# Patient Record
Sex: FEMALE
[De-identification: names, ages, dates, MRNs, and addresses within clinical notes are randomized per-mention and may not be internally consistent; named-entity substitution may affect disease eponyms.]

---

## 2022-08-11 PROBLEM — Z00.00 ENCOUNTER FOR PREVENTIVE HEALTH EXAMINATION: Status: ACTIVE | Noted: 2022-08-11

## 2022-08-17 ENCOUNTER — APPOINTMENT (OUTPATIENT)
Dept: PULMONOLOGY | Facility: CLINIC | Age: 77
End: 2022-08-17

## 2023-03-07 ENCOUNTER — APPOINTMENT (OUTPATIENT)
Dept: PULMONOLOGY | Facility: CLINIC | Age: 78
End: 2023-03-07
Payer: MEDICARE

## 2023-03-07 VITALS
OXYGEN SATURATION: 98 % | HEIGHT: 62 IN | WEIGHT: 107 LBS | TEMPERATURE: 97 F | DIASTOLIC BLOOD PRESSURE: 60 MMHG | HEART RATE: 83 BPM | BODY MASS INDEX: 19.69 KG/M2 | SYSTOLIC BLOOD PRESSURE: 102 MMHG

## 2023-03-07 DIAGNOSIS — Z85.038 PERSONAL HISTORY OF OTHER MALIGNANT NEOPLASM OF LARGE INTESTINE: ICD-10-CM

## 2023-03-07 DIAGNOSIS — J44.9 CHRONIC OBSTRUCTIVE PULMONARY DISEASE, UNSPECIFIED: ICD-10-CM

## 2023-03-07 DIAGNOSIS — I89.0 LYMPHEDEMA, NOT ELSEWHERE CLASSIFIED: ICD-10-CM

## 2023-03-07 DIAGNOSIS — Z87.891 PERSONAL HISTORY OF NICOTINE DEPENDENCE: ICD-10-CM

## 2023-03-07 DIAGNOSIS — Z23 ENCOUNTER FOR IMMUNIZATION: ICD-10-CM

## 2023-03-07 PROCEDURE — 94729 DIFFUSING CAPACITY: CPT

## 2023-03-07 PROCEDURE — 99204 OFFICE O/P NEW MOD 45 MIN: CPT | Mod: 25

## 2023-03-07 PROCEDURE — 94727 GAS DIL/WSHOT DETER LNG VOL: CPT

## 2023-03-07 PROCEDURE — 94060 EVALUATION OF WHEEZING: CPT

## 2023-03-07 RX ORDER — ASPIRIN 81 MG
81 TABLET, DELAYED RELEASE (ENTERIC COATED) ORAL
Refills: 0 | Status: ACTIVE | COMMUNITY

## 2023-03-07 RX ORDER — ATORVASTATIN CALCIUM 10 MG/1
10 TABLET, FILM COATED ORAL
Refills: 0 | Status: ACTIVE | COMMUNITY

## 2023-03-07 RX ORDER — AMLODIPINE BESYLATE 5 MG/1
5 TABLET ORAL
Refills: 0 | Status: ACTIVE | COMMUNITY

## 2023-03-07 RX ORDER — LOSARTAN POTASSIUM 50 MG/1
50 TABLET, FILM COATED ORAL
Refills: 0 | Status: ACTIVE | COMMUNITY

## 2023-03-07 NOTE — CONSULT LETTER
[Dear  ___] : Dear  [unfilled], [Courtesy Letter:] : I had the pleasure of seeing your patient, [unfilled], in my office today. [Please see my note below.] : Please see my note below. [Consult Closing:] : Thank you very much for allowing me to participate in the care of this patient.  If you have any questions, please do not hesitate to contact me. [Sincerely,] : Sincerely, [FreeTextEntry2] : Agusto Reyes MD\par [address]\[place] [address] [FreeTextEntry3] : Ashley Parra MD, FCCP\par

## 2023-03-07 NOTE — ASSESSMENT
[FreeTextEntry1] : Data reviewed:\par \par PA/lat CXR [facility] 2/2022 personally reviewed : flat diaphragms\par \par PFT 03/07/2023 : mod fixed obstruction, FEV1 55%, couldn't wash out, DLCO 47%\par \par Impression:\par Moderate COPD\par Former smoker lacks py hx for LDCT\par \par Plan:\par Start Anoro or other dual dilator as covered.\par Can see me annually or as needed.\par [id number] given.

## 2023-03-07 NOTE — PHYSICAL EXAM
[No Acute Distress] : no acute distress [Normal Rate/Rhythm] : normal rate/rhythm [Normal S1, S2] : normal s1, s2 [No Murmurs] : no murmurs [No Resp Distress] : no resp distress [Clear to Auscultation Bilaterally] : clear to auscultation bilaterally [No Clubbing] : no clubbing [TextBox_99] : scoliosis [TextBox_105] : RLE lymphedema

## 2023-03-07 NOTE — HISTORY OF PRESENT ILLNESS
[TextBox_4] : 03/07/2023: Self referred, pt of Dr Reyes (cards [facility]). Colorectal cancer 1993, colectomy/colostomy, chemo. A year ago had surgery for a parastomal hernia. [place] [facility] [place] Had a CXR at [facility] and this was read as COPD. She does note dyspnea for perhaps the past 4 years, noticeably at first if walking uphill. Does notice it going up an incline, may have to stop. Smoked mid-20s to age 48 (1/2 ppd max) when she had cancer, though would intermittently go back to smoking. Gave it up for good March 2014. Never diagnosed with any lung disease. 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

## 2023-06-14 RX ORDER — UMECLIDINIUM BROMIDE AND VILANTEROL TRIFENATATE 62.5; 25 UG/1; UG/1
62.5-25 POWDER RESPIRATORY (INHALATION)
Qty: 180 | Refills: 3 | Status: DISCONTINUED | COMMUNITY
Start: 2023-03-07 | End: 2023-06-14

## 2023-06-14 RX ORDER — TIOTROPIUM BROMIDE AND OLODATEROL 3.124; 2.736 UG/1; UG/1
2.5-2.5 SPRAY, METERED RESPIRATORY (INHALATION)
Qty: 1 | Refills: 11 | Status: ACTIVE | COMMUNITY
Start: 2023-06-14 | End: 1900-01-01

## 2024-08-15 ENCOUNTER — APPOINTMENT (OUTPATIENT)
Dept: PULMONOLOGY | Facility: CLINIC | Age: 79
End: 2024-08-15
Payer: MEDICARE

## 2024-08-15 VITALS
SYSTOLIC BLOOD PRESSURE: 126 MMHG | WEIGHT: 106 LBS | OXYGEN SATURATION: 97 % | HEIGHT: 62 IN | TEMPERATURE: 98.6 F | HEART RATE: 78 BPM | DIASTOLIC BLOOD PRESSURE: 72 MMHG | BODY MASS INDEX: 19.51 KG/M2

## 2024-08-15 DIAGNOSIS — J44.9 CHRONIC OBSTRUCTIVE PULMONARY DISEASE, UNSPECIFIED: ICD-10-CM

## 2024-08-15 PROCEDURE — 99213 OFFICE O/P EST LOW 20 MIN: CPT

## 2024-08-15 NOTE — HISTORY OF PRESENT ILLNESS
[Former] : former [< 20 pack-years] : < 20 pack-years [TextBox_4] : 03/07/2023: Self referred, pt of Dr Reyes (cards [facility]). Colorectal cancer 1993, colectomy/colostomy, chemo. A year ago had surgery for a parastomal hernia. [place] [facility] [place] Had a CXR at [facility] and this was read as COPD. She does note dyspnea for perhaps the past 4 years, noticeably at first if walking uphill. Does notice it going up an incline, may have to stop. Smoked mid-20s to age 48 (1/2 ppd max) when she had cancer, though would intermittently go back to smoking. Gave it up for good March 2014. Never diagnosed with any lung disease. 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  8/15/2024: Copay for Stiolto was $113 and she didn't really know how to use. Pharmacy showed her but she just tried it for <1 month and quit when it didn't seem to help. Has nasal congestion as well. [place]. Also has the lymphedema. Lost 10 lbs 2 mos ago in the course of an acute illness w cough. Appetite good.    [YearQuit] : 2014

## 2024-08-15 NOTE — ASSESSMENT
[FreeTextEntry1] : Data reviewed:  PA/lat CXR [facility] 2/2022 : flat diaphragms  PFT 03/07/2023 : mod fixed obstruction, FEV1 55%, couldn't wash out, DLCO 47%  Impression: Moderate COPD Former smoker lacks py hx for LDCT  Plan: Start Anoro - gave her a sample and taught her. Trial for one month. If this improves her dyspnea, then we can seek the cheapest covered alternative and teach her to use whatever that is.

## 2024-08-15 NOTE — CONSULT LETTER
[Dear  ___] : Dear  [unfilled], [Courtesy Letter:] : I had the pleasure of seeing your patient, [unfilled], in my office today. [Please see my note below.] : Please see my note below. [Consult Closing:] : Thank you very much for allowing me to participate in the care of this patient.  If you have any questions, please do not hesitate to contact me. [Sincerely,] : Sincerely, [FreeTextEntry2] : Agusto Reyes MD\par  [address]\[place] [address] [FreeTextEntry3] : Ashley Parra MD, FCCP\par

## 2024-09-16 RX ORDER — UMECLIDINIUM BROMIDE AND VILANTEROL TRIFENATATE 62.5; 25 UG/1; UG/1
62.5-25 POWDER RESPIRATORY (INHALATION)
Qty: 180 | Refills: 3 | Status: ACTIVE | COMMUNITY
Start: 2024-09-16 | End: 1900-01-01

## 2024-09-27 RX ORDER — TIOTROPIUM BROMIDE AND OLODATEROL 3.124; 2.736 UG/1; UG/1
2.5-2.5 SPRAY, METERED RESPIRATORY (INHALATION)
Qty: 3 | Refills: 3 | Status: ACTIVE | COMMUNITY
Start: 2024-09-27 | End: 1900-01-01